# Patient Record
(demographics unavailable — no encounter records)

---

## 2017-10-20 NOTE — EMERGENCY DEPARTMENT REPORT
ED Male  HPI





- General


Chief complaint: Urogenital-Male


Stated complaint: TESTICLES SWOLLEN


Time Seen by Provider: 10/20/17 11:44


Source: patient, RN notes reviewed


Mode of arrival: Ambulatory


Limitations: No Limitations





- History of Present Illness


Initial comments: 





This is a 19-year-old male, previously unknown to this provider, presents to 

the ER with left-sided testicular pain.  He also reports penile discharge.  He 

has no nausea, vomiting, fevers, chills or abdominal pain.  The pain is achy, 

increases with palpation, decreases with rest and position, and does not 

radiate anywhere.


MD Complaint: testicle pain, penile discharge


-: Gradual, days(s)


Location: left testicle


Radiation: none


Severity: mild


Quality: aching


Consistency: intermittent


Improves with: rest


Worsens with: movement


new sexual partner


discharge





- Related Data


Sexually active: Yes


 Previous Rx's











 Medication  Instructions  Recorded  Last Taken  Type


 


Doxycycline [Vibramycin] 100 mg PO Q12HR #28 capsule 10/20/17 Unknown Rx


 


Ibuprofen [Motrin] 600 mg PO Q8H PRN #30 tablet 10/20/17 Unknown Rx


 


Ondansetron [Zofran Odt] 4 mg PO Q8HR PRN #20 tab.rapdis 10/20/17 Unknown Rx











 Allergies











Allergy/AdvReac Type Severity Reaction Status Date / Time


 


No Known Allergies Allergy   Unverified 10/20/17 10:24














ED Review of Systems


ROS: 


Stated complaint: TESTICLES SWOLLEN


Other details as noted in HPI





Constitutional: denies: fever


Eyes: denies: eye discharge


ENT: denies: throat pain, epistaxis


Respiratory: denies: orthopnea


Cardiovascular: denies: chest pain


Gastrointestinal: denies: abdominal pain


Genitourinary: discharge, testicular pain.  denies: urgency, dysuria


Musculoskeletal: denies: back pain


Skin: denies: lesions


Neurological: denies: weakness





ED Past Medical Hx





- Past Medical History


Previous Medical History?: No





- Surgical History


Past Surgical History?: No





- Social History


Smoking Status: Never Smoker


Substance Use Type: None





- Medications


Home Medications: 


 Home Medications











 Medication  Instructions  Recorded  Confirmed  Last Taken  Type


 


Doxycycline [Vibramycin] 100 mg PO Q12HR #28 capsule 10/20/17  Unknown Rx


 


Ibuprofen [Motrin] 600 mg PO Q8H PRN #30 tablet 10/20/17  Unknown Rx


 


Ondansetron [Zofran Odt] 4 mg PO Q8HR PRN #20 tab.rapdis 10/20/17  Unknown Rx














ED Physical Exam





- General


Limitations: No Limitations


General appearance: alert, in no apparent distress





- Head


Head exam: Present: atraumatic, normocephalic





- Eye


Eye exam: Present: normal appearance, EOMI.  Absent: nystagmus





- ENT


ENT exam: Present: normal exam, normal orophraynx, mucous membranes moist, 

normal external ear exam





- Neck


Neck exam: Present: normal inspection, full ROM





- Respiratory


Respiratory exam: Present: normal lung sounds bilaterally.  Absent: respiratory 

distress, wheezes, rales, rhonchi, stridor, chest wall tenderness, accessory 

muscle use, decreased breath sounds, prolonged expiratory





- Cardiovascular


Cardiovascular Exam: Present: regular rate, normal rhythm, normal heart sounds.

  Absent: systolic murmur, diastolic murmur, rubs, gallop





- GI/Abdominal


GI/Abdominal exam: Present: soft, normal bowel sounds.  Absent: distended, 

tenderness, guarding, rebound, rigid, pulsatile mass





- Rectal


Rectal exam: Present: deferred





- 


 exam: Present: normal inspection, testicular tenderness (there is left-sided 

testicular tenderness.  There is normal testicular lie bilaterally.  There is a 

normal cremasteric reflex bilaterally.)


External exam: Present: normal external exam





- Extremities Exam


Extremities exam: Present: normal inspection, full ROM, normal capillary 

refill.  Absent: pedal edema, joint swelling, calf tenderness





- Back Exam


Back exam: Present: normal inspection, full ROM





- Neurological Exam


Neurological exam: Present: alert, oriented X3, normal gait, other (Extraocular 

movements intact.  Tongue midline.  No facial droop.  Facial sensation intact 

to light touch in the V1, V2, V3 distribution bilaterally.  5 and 5 strength in 

4 extremities..  Sensation is intact to light touch in 4 extremities.).  Absent

: motor sensory deficit





- Psychiatric


Psychiatric exam: Present: normal affect, normal mood





- Skin


Skin exam: Present: warm, dry, intact, normal color.  Absent: rash





ED Course


 Vital Signs











  10/20/17 10/20/17





  10:22 13:14


 


Temperature 97.7 F 


 


Pulse Rate 72 76


 


Respiratory 18 16





Rate  


 


Blood Pressure 120/73 


 


Blood Pressure  124/70





[Left]  


 


O2 Sat by Pulse 99 95





Oximetry  














ED Medical Decision Making





- Lab Data








 Vital Signs











  10/20/17





  10:22


 


Temperature 97.7 F


 


Pulse Rate 72


 


Respiratory 18





Rate 


 


Blood Pressure 120/73


 


O2 Sat by Pulse 99





Oximetry 














 Lab Results











  10/20/17 Range/Units





  10:46 


 


Urine Color  Yellow  (Yellow)  


 


Urine Turbidity  Clear  (Clear)  


 


Urine pH  5.0  (5.0-7.0)  


 


Ur Specific Gravity  1.027  (1.003-1.030)  


 


Urine Protein  <15 mg/dl  (Negative)  mg/dL


 


Urine Glucose (UA)  Neg  (Negative)  mg/dL


 


Urine Ketones  Neg  (Negative)  mg/dL


 


Urine Blood  Sm  (Negative)  


 


Urine Nitrite  Neg  (Negative)  


 


Urine Bilirubin  Neg  (Negative)  


 


Urine Urobilinogen  < 2.0  (<2.0)  mg/dL


 


Ur Leukocyte Esterase  Mod  (Negative)  


 


Urine WBC (Auto)  18.0 H  (0.0-6.0)  /HPF


 


Urine RBC (Auto)  9.0  (0.0-6.0)  /HPF


 


U Epithel Cells (Auto)  < 1.0  (0-13.0)  /HPF


 


Urine Bacteria (Auto)  1+  (Negative)  /HPF


 


Urine Mucus  Few  /HPF














- Radiology Data


Radiology results: report reviewed, image reviewed





Testicular ultrasound demonstrates left-sided epididymitis.  No evidence of 

orchitis, no evidence of torsion





- Medical Decision Making





Differential diagnosis: Epididymitis, orchitis, testicular torsion, urinary 

tract infection





Assessment and plan: 19-year-old male with reported penile discharge, left-

sided testicular pain, urinalysis suggesting chlamydia, testicular ultrasound 

confirms left-sided epididymitis.  Patient is afebrile with reassuring vital 

signs, has a benign examination, and will be treated empirically with 

ceftriaxone and doxycycline.  Anticipatory guidance and counseling were 

provided to the patient.  He should follow up with outpatient primary care 

doctor or the health department for STI testing.


Critical care attestation.: 


If time is entered above; I have spent that time in minutes in the direct care 

of this critically ill patient, excluding procedure time.








ED Disposition


Clinical Impression: 


 Epididymitis





Disposition: DC-01 TO HOME OR SELFCARE


Is pt being admited?: No


Does the pt Need Aspirin: No


Condition: Stable


Instructions:  Epididymitis (ED)


Additional Instructions: 


As we discussed, your laboratory studies appeared to be within normal limits. 











Ultrasound demonstrated left-sided epididymitis, which is typically a sexual 

transmitted disease.  











Cultures were sent today, and results will be available next 3-5 days.  Please 

have your primary care doctor call the medical records department to obtain 

your culture results.  Take the antibiotic therapy as directed.  Take the 

nausea medication and pain medication as directed.  I recommend outpatient 

testing for sexually transmitted diseases, including hepatitis, syphilis and 

HIV.  I also recommend that you abstain from sexual activity until you have 

completed her antibiotic therapy, a physician states that it is safe for you to 

resume sexual activity, and any partners that you have been sexually active 

with have been tested/treated/evaluated for sexual transmitted diseases.








Please follow-up with physician within 3-5 days.








I recommend that you return to the ER right away with worsening pain, migration 

of pain, intractable nausea/vomiting, inability tolerate liquid feeds.  Take 

the antibiotic with food, take the pain medication with food.  Avoid prolonged 

exposure to sunlight, as the doxycycline can cause a nonspecific skin rash.











Prescriptions: 


Doxycycline [Vibramycin] 100 mg PO Q12HR #28 capsule


Ibuprofen [Motrin] 600 mg PO Q8H PRN #30 tablet


 PRN Reason: Pain


Ondansetron [Zofran Odt] 4 mg PO Q8HR PRN #20 tab.rapdis


 PRN Reason: Nausea


Referrals: 


PRIMARY CARE,MD [Primary Care Provider] - 3-5 Days


IRIS MACIAS MD [Staff Physician] - 3-5 Days


[facility] [Provider Group] - 3-5 Days


[facility] [Outside] - 3-5 Days


Forms:  STI Treatment and Prevention

## 2017-10-20 NOTE — ULTRASOUND REPORT
Testicular sonogram:



History: Left testis pain.



Findings:



Right testis measures 5.6 x 2.2 x 3.2 cm. Uniform echogenicity with 

normal color flow. Cyst of the head of the epididymis is noted 

measuring 0.29 x 0.12 cm. 



Left testes measures 5.4 x 2 x 2.7 cm. Uniform echogenicity with normal 

color flow. Enlargement noted of epididymis with increased color flow. 

More pronounced at tail.



Impression:



Epididymitis left testis. No orchitis. No significant fluid in the 

scrotal sac.